# Patient Record
Sex: FEMALE | Race: WHITE | NOT HISPANIC OR LATINO | Employment: FULL TIME | URBAN - METROPOLITAN AREA
[De-identification: names, ages, dates, MRNs, and addresses within clinical notes are randomized per-mention and may not be internally consistent; named-entity substitution may affect disease eponyms.]

---

## 2019-11-20 ENCOUNTER — HOSPITAL ENCOUNTER (EMERGENCY)
Facility: MEDICAL CENTER | Age: 39
End: 2019-11-20
Attending: EMERGENCY MEDICINE
Payer: COMMERCIAL

## 2019-11-20 VITALS
OXYGEN SATURATION: 95 % | HEIGHT: 64 IN | TEMPERATURE: 99.8 F | SYSTOLIC BLOOD PRESSURE: 114 MMHG | HEART RATE: 124 BPM | WEIGHT: 105 LBS | RESPIRATION RATE: 16 BRPM | DIASTOLIC BLOOD PRESSURE: 69 MMHG | BODY MASS INDEX: 17.93 KG/M2

## 2019-11-20 DIAGNOSIS — J10.1 INFLUENZA A: ICD-10-CM

## 2019-11-20 LAB
FLUAV RNA SPEC QL NAA+PROBE: POSITIVE
FLUBV RNA SPEC QL NAA+PROBE: NEGATIVE

## 2019-11-20 PROCEDURE — 99284 EMERGENCY DEPT VISIT MOD MDM: CPT | Mod: EDC

## 2019-11-20 PROCEDURE — A9270 NON-COVERED ITEM OR SERVICE: HCPCS | Mod: EDC | Performed by: EMERGENCY MEDICINE

## 2019-11-20 PROCEDURE — 87502 INFLUENZA DNA AMP PROBE: CPT | Mod: EDC

## 2019-11-20 PROCEDURE — 700102 HCHG RX REV CODE 250 W/ 637 OVERRIDE(OP): Mod: EDC | Performed by: EMERGENCY MEDICINE

## 2019-11-20 RX ORDER — OSELTAMIVIR PHOSPHATE 75 MG/1
75 CAPSULE ORAL 2 TIMES DAILY
Qty: 9 CAP | Refills: 0 | Status: SHIPPED | OUTPATIENT
Start: 2019-11-20 | End: 2019-11-20 | Stop reason: SDUPTHER

## 2019-11-20 RX ORDER — ACETAMINOPHEN 325 MG/1
650 TABLET ORAL ONCE
Status: DISCONTINUED | OUTPATIENT
Start: 2019-11-20 | End: 2019-11-20 | Stop reason: HOSPADM

## 2019-11-20 RX ORDER — OSELTAMIVIR PHOSPHATE 75 MG/1
75 CAPSULE ORAL ONCE
Status: COMPLETED | OUTPATIENT
Start: 2019-11-20 | End: 2019-11-20

## 2019-11-20 RX ORDER — OSELTAMIVIR PHOSPHATE 75 MG/1
75 CAPSULE ORAL 2 TIMES DAILY
Qty: 9 CAP | Refills: 0 | Status: SHIPPED | OUTPATIENT
Start: 2019-11-20 | End: 2019-11-25

## 2019-11-20 RX ADMIN — OSELTAMIVIR PHOSPHATE 75 MG: 75 CAPSULE ORAL at 09:53

## 2019-11-20 SDOH — HEALTH STABILITY: MENTAL HEALTH: HOW OFTEN DO YOU HAVE A DRINK CONTAINING ALCOHOL?: NOT ASKED

## 2019-11-20 NOTE — ED NOTES
Lab called with critical results of influenza A positive. Results read back to lab. Dr. Baron notified.

## 2019-11-20 NOTE — ED TRIAGE NOTES
"Sheyla Elias  39 y.o.  Chief Complaint   Patient presents with   • Cough     \"for a few days\". Pts child with recent respiratory illness.    • Sore Throat     Ambulatory to Yellow 40. Pt alert, pink, interactive and in NAD. Respirations even and unlabored, lungs CTA. Denies vomiting or diarrhea. Call light within reach. Denies additional needs. Aware to remain NPO until cleared by ERP.   "

## 2019-11-20 NOTE — ED NOTES
Discharge teaching for influenza provided to patient and spouse. Reviewed home care, importance of hydration and when to return to ED with worsening symptoms. Tylenol and Motrin dosing discussed. Tamiflu Rx instruction given, script called into preferred pharmacy per pt request.  Instructed on importance of follow up care with Manjula Logan M.D.  [id number] [address] [id number] [phone]    In 3 days       All questions answered, patient verbalizes understanding to all teaching. Copy of discharge paperwork provided. Signed copy in chart. Armband removed. Pt alert, pink, interactive and in NAD. Ambulatory out of department in stable condition.

## 2019-11-20 NOTE — ED PROVIDER NOTES
"ED Provider Note    CHIEF COMPLAINT  Chief Complaint   Patient presents with   • Cough     \"for a few days\". Pts child with recent respiratory illness.    • Sore Throat       HPI  Sheyla Elias is a 39 y.o. female who presents to the emergency room with a cough.  Has minimal dry cough but otherwise doing well.  Symptoms seem to be worse last night.  A bit of a sore throat and scratchiness.  Generalized body aches.  Has not had a fever.  No difficulty breathing.  No vomiting or diarrhea.  No headache neck stiffness or rash.  Everyone at home is sick with a similar illness.  The only reason the mother is checking in this morning is because her 3-year-old son passed away suddenly this morning after 2 days of URI symptoms with fever.    REVIEW OF SYSTEMS  As per HPI.    PAST MEDICAL HISTORY  No medical problems    SOCIAL HISTORY  Social History     Tobacco Use   • Smoking status: Never Smoker   • Smokeless tobacco: Never Used   Substance Use Topics   • Alcohol use: Yes     Comment: occassionally drinks wine   • Drug use: Not on file       SURGICAL HISTORY  History reviewed. No pertinent surgical history.    ALLERGIES  No Known Allergies    PHYSICAL EXAM  VITAL SIGNS: /67   Pulse (!) 115   Temp 37.9 °C (100.2 °F) (Oral)   Resp 16   SpO2 94%    Constitutional: Awake and alert. Nontoxic.  Tearful  HENT: Nares with mucosal edema and mild clear rhinorrhea.  Tympanic membranes clear.  Pharynx normal.  Eyes: Grossly normal  Neck: Normal range of motion  Cardiovascular: Mildly elevated heart rate   Thorax & Lungs: No respiratory distress.  Lung fields are clear throughout without wheezes, rales, rhonchi.  Abdomen: Nontender  Skin:  No pathologic rash.   Extremities: Well perfused  Psychiatric: Affect normal    Results for orders placed or performed during the hospital encounter of 11/20/19   Influenza A/B By PCR (Adult - Flu Only)   Result Value Ref Range    Influenza virus A RNA POSITIVE (A) Negative    " Influenza virus B, PCR Negative Negative        COURSE & MEDICAL DECISION MAKING  Patient presents with URI symptoms of less than 24 hours duration.  Vital signs are stable.  Clinically nontoxic.  Obviously distraught about the events that occurred this morning.  She was given Tylenol for fever.  I obtained an influenza swab that was positive.  Patient given first dose of Tamiflu in the ER.  Patient is nontoxic and appropriate for outpatient management.  I have given a prescription for Tamiflu and advised to return to the ER for any difficulty breathing, worsening symptoms, not improving or concern.    FINAL IMPRESSION  1.  Influenza A      Disposition: home in good condition      This dictation was created using voice recognition software. The accuracy of the dictation is limited to the abilities of the software.  The nursing notes were reviewed and certain aspects of this information were incorporated into this note.      Electronically signed by: Shashi Baron, 11/20/2019 8:40 AM

## 2021-02-11 ENCOUNTER — HOSPITAL ENCOUNTER (OUTPATIENT)
Dept: LAB | Facility: MEDICAL CENTER | Age: 41
End: 2021-02-11
Attending: OBSTETRICS & GYNECOLOGY
Payer: COMMERCIAL

## 2021-02-11 PROCEDURE — 87491 CHLMYD TRACH DNA AMP PROBE: CPT

## 2021-02-11 PROCEDURE — 87591 N.GONORRHOEAE DNA AMP PROB: CPT

## 2021-02-12 LAB
C TRACH DNA SPEC QL NAA+PROBE: NEGATIVE
N GONORRHOEA DNA SPEC QL NAA+PROBE: NEGATIVE
SPECIMEN SOURCE: NORMAL

## 2021-05-20 ENCOUNTER — HOSPITAL ENCOUNTER (OUTPATIENT)
Dept: LAB | Facility: MEDICAL CENTER | Age: 41
End: 2021-05-20
Attending: OBSTETRICS & GYNECOLOGY
Payer: COMMERCIAL

## 2021-05-20 LAB
GLUCOSE 1H P 50 G GLC PO SERPL-MCNC: 131 MG/DL (ref 70–139)
HCT VFR BLD AUTO: 34.9 % (ref 37–47)
HGB BLD-MCNC: 11.6 G/DL (ref 12–16)
PLATELET # BLD AUTO: 236 K/UL (ref 164–446)
TREPONEMA PALLIDUM IGG+IGM AB [PRESENCE] IN SERUM OR PLASMA BY IMMUNOASSAY: NORMAL

## 2021-05-20 PROCEDURE — 85018 HEMOGLOBIN: CPT

## 2021-05-20 PROCEDURE — 36415 COLL VENOUS BLD VENIPUNCTURE: CPT

## 2021-05-20 PROCEDURE — 85014 HEMATOCRIT: CPT

## 2021-05-20 PROCEDURE — 86780 TREPONEMA PALLIDUM: CPT

## 2021-05-20 PROCEDURE — 82950 GLUCOSE TEST: CPT

## 2021-05-20 PROCEDURE — 85049 AUTOMATED PLATELET COUNT: CPT

## 2021-07-22 ENCOUNTER — HOSPITAL ENCOUNTER (OUTPATIENT)
Dept: LAB | Facility: MEDICAL CENTER | Age: 41
End: 2021-07-22
Attending: OBSTETRICS & GYNECOLOGY
Payer: COMMERCIAL

## 2021-07-22 PROCEDURE — 87150 DNA/RNA AMPLIFIED PROBE: CPT

## 2021-07-22 PROCEDURE — 87081 CULTURE SCREEN ONLY: CPT

## 2021-07-23 LAB — GP B STREP DNA SPEC QL NAA+PROBE: NEGATIVE

## 2021-09-03 ENCOUNTER — OFFICE VISIT (OUTPATIENT)
Dept: OBGYN | Facility: CLINIC | Age: 41
End: 2021-09-03
Payer: COMMERCIAL

## 2021-09-03 DIAGNOSIS — O92.29 SORE NIPPLES DUE TO LACTATION: ICD-10-CM

## 2021-09-03 DIAGNOSIS — O92.70 LACTATION PROBLEM: ICD-10-CM

## 2021-09-03 PROCEDURE — 99205 OFFICE O/P NEW HI 60 MIN: CPT | Performed by: NURSE PRACTITIONER

## 2021-09-03 NOTE — PROGRESS NOTES
Summary: Third baby, smallest of all. Exclusive breastfeeding, left nipple developed crack and bleeding, milk coming in more now. Uses Haakaa on the left more often to allow for healing. Baby with frequent stools appropriate to day of age, mild jaundice, alert, healthy appearing.   Today Mom assisted latch cross cradle to right for 12ml, mom could feel deep latch and baby removing milk.Nipple minimally creased but not fully round.  To left cracked side, started in football for different positioning, deep latch, 3ml, nipple round, tried cross cradle after diaper change and dressed, more interested in sleeping. Total intake 15ml, 1/2 feeding but parents offering frequently.   Plan Continue with exclusive breastfeeding Haakaa left to keep milk supply if not offering that breast. Would offer at least 1/2 of the feedings to remove more milk with deeper latch. No pumping needed today. Scale at home would anticipate 2oz gain over the next 3 days. If baby fussy will need to pump and feed back but not indicated at this time.  Follow up Midwife following infant for first 6 weeks, have a family practice doctor in [place] that will follow after and as needed. Lactation follow up as needed, encouraged email with concerns.    Subjective:     Sheyla Elias is a 41 y.o. female here for lactation care. She is here today with baby and  (Bayron).    Concerns:   nipple pain  and sleepy baby  HPI:   Pertinent  history:   Full term home water birth.  Mother does not have a history of GDM, hypertension prior to pregnancy, insulin resistance, multiple gestation, PCOS and thyroid disease. Common condition(s) which may interfere with milk supply.    Mother does have advanced maternal age. Common condition(s) that may interfere in milk supply.    FEEDING HISTORY:    Past breastfeeding history:  other two children  Postpartum course: Exclusive breastfeeding by 48 hours nipples cracked, as milk increasing, right  healing  Currently: 9/3/2021Uses Juanakaa on the left more often to allow for healing. Exclusive breastfeeding no bottles no pumping,  Did provide some expressed milk by syringe once    Both breasts: No not always, but offers frequently  Bottle feeds: 0x/24h    Supplement:   None     Nipple Shield Use: None    Breast Pumping:   Not pumping  Type of pump: Haakaa    Maternal ROS:  Constitutional: No fever, chills. Feeling well  Breasts: No soreness of breasts and Soreness of breasts  Psychiatric: Managing ok  Mental Health: No mention of feeling irritable, agitated, angry, overwhelmed, apathy, exhaustion nor having sleep changes outside infant feeds/demands or appetite changes     Objective:     Maternal Physical   General: No acute distress  Breasts: Symetrical , Soft, Plugged Duct - no evidence and Mastitis  - no S/S  Nipples: abraded, cracked, erythematous, scabbed/crusting and across face of nipple on the left, right is intact.  Psychiatric:  Normal mood and affect. Her behavior is normal. Judgment and thought content normal   Mental Health:  Did NOT exhibit sadness, crying, feeling overwhelmed, agitation or hypervigilance.    Assessment/Plan & Lactation Counseling:     Infant exam on infant chart    Infant Weight History:   8/30/21 6#2.0oz  9/3/2021 5#11.5oz diaper  Infant intake at Breast:: right 12ml      Left 3ml     Total: 15ml  Milk Transfer at this feeding:   Effective breastfeeding   Initiation of Feeding: Infant initiates  Position of Feeding:    Right: cross cradle  Left: football and cross cradle  Attachment Achieved: rapidly  Nipple shield: N/A       Suck Pattern at the breast: Suck burst and normal rest  Behavior Following Observed Feeding: sleeping    Nipple Pain from:High vacuum causing nipple strain resulting in damage and Contact forces of the tongue causing nipple strain resulting in damage     Latch: Assisted latch and Shallow latch  Suckling/Feeding: attaches, audible swallows, baby fed  effectively, baby roots, elicits KRANTHI, frequent pauses and rhythmic  Milk Supply Available: normal  Maternal Diagnosis/Problem:  Lactation problem    BREASTFEEDING PLAN  Discussed concerns and symptoms as listed above in assessment and guidance summarized below.  Topics reviewed included:    •  Self Care. Supportive spouse, Encourage rest, getting out of the house each day, walk when air clearer,   a treat at a drive thru.    • Milk supply is dependent on glandular tissue development, hormonal influences, how many times the baby removes milk and how well the breasts are emptied in a 24 hour period. This is a biological reality that we can NOT work around. If, for any reason, your baby is not latching, or you are not able to nurse, then it is important for you to remove the milk instead by pumping or hand expression.  There's no magic trick, tea, food, drink, cookie or supplement that will increase your milk supply. One  must  effectively remove milk to continue to make and maximize milk. In the early days and weeks that can be 8+ times in 24 hours. For older babies, on average 6-7 + times in 24 hours.      •  Feeding:   o Feed your baby every 1.5-3 hours, more often if baby acts hungry.   o Awaken baby for feeding if going over 3 hours in the day.   o Until back to birth weight, ONE four hour at night is acceptable if has had 8 prior feedings in 24 hours.    o Need to get in 8-12 feedings per 24 hours. If one sided nursing may need 15 feedings per day  o Parents most responsive to feeding and waking baby   •  Supplement:   • No supplement is needed     Pacifier Use:  The [employer] Position Paper reports: Although we recommend a conservative approach regarding pacifier use, we do not endorse a complete ban on the use of pacifiers, nor do we support an approach that induces parental guilt concerning their choices about the use of pacifiers.    • Positioning Techniques for bare  breast  o Suggested positions Cross cradle and Football  o Fine tune position by making sure your fingers beneath the breast as well as your bra, are out of the way of your baby's chin.  o Positioning:  Many positions shown, great sidelying at 7 minutes.   o See http://globalhealthmedia.org/portfolio-items/positions-for-breastfeeding/?lihslrikpIS=35871    •  Latch on Techniques for bare breast.   o Fine tune latch:  - By holding your baby more securely at the breast, assisting your baby to stay attached by:  - Bringing your baby to your breast, not breast to the baby  - Your baby's cheek to touch breast securely, nose tipped back  - Hold your baby firmly in place so when your baby forgets to suck and picks it back up again your baby is in the correct spot. You will be extinguishing behavior and replacing it with a deeper latch to stimulate suck and provide satisfaction at the breast  - Your baby needs as much breast as deep in the mouth as possible to allow your nipples to heal and for you more importantly to maximize efficiency at the breast  • Latch is asymmetrical, leading with the chin, getting more underneath.Nipple care:  • May apply breastmilk  • Moist-oily ointment after feeding/pumping, ie Lanolin nipple butter, coconut or olive oil, if desired/needed 2-3 times/day until nipples are healed  • You do not need to wash this off before pumping or feeding the baby  • You may want to remove baby from breast when active swallowing stops to avoid prolonged nipple compression  • Hydrogels recommended    •  Connect with other mothers:  o Facebook:   - [place] [facility]: https://www.PingMe.com/[place].breastfeeds/  - Well-Nourished Babies (Private group for questions and support): https://www.facebook.com/groups/272060731035710/  o Breastfeeding [place]   - This is an emotional, informational and safe support [place] with your like-minded community that builds solidarity among moms  - The honest experiences of mothers  are highly valued among the other mothers  - Tuesday  at 11am by Magali,  you will be sent an invitation.   - You may instead copy and paste this link:    [email or web address]    - Passcode  [id number]  - You may share this link with friends; please don't post on social media.       Follow up requires close monitoring in this time sensitive window of opportunity to establish milk supply and facilitate the learning of  breastfeeding.    Mom is encouraged to e-mail to update how the plan is working.  Midwife for baby care appointment: Today    Follow-up for infant weight check and dyad breastfeeding evaluation as needed  Please call [phone] if you have not scheduled your next appointment    A total of 69 minutes, not including infant assessment time, with more than 50% was spent preparing to see the patient, obtaining and reviewing separately obtained history, performing a medically appropriate examination and evaluation, counseling and educating the family,  documenting clinical information in the electronic health record, independently interpreting weighted feeds and infant growth results, communicating these results to the family and care coordination as detailed in the above note.       FILIPE Kline.

## 2021-10-15 ENCOUNTER — HOSPITAL ENCOUNTER (OUTPATIENT)
Facility: MEDICAL CENTER | Age: 41
End: 2021-10-15
Attending: OBSTETRICS & GYNECOLOGY
Payer: COMMERCIAL

## 2021-10-15 LAB
T3 SERPL-MCNC: 80.7 NG/DL (ref 60–181)
T4 FREE SERPL-MCNC: 1.26 NG/DL (ref 0.93–1.7)
THYROPEROXIDASE AB SERPL-ACNC: 21 IU/ML (ref 0–9)
TSH SERPL DL<=0.005 MIU/L-ACNC: 1.45 UIU/ML (ref 0.38–5.33)

## 2021-10-15 PROCEDURE — 84439 ASSAY OF FREE THYROXINE: CPT

## 2021-10-15 PROCEDURE — 84480 ASSAY TRIIODOTHYRONINE (T3): CPT

## 2021-10-15 PROCEDURE — 36415 COLL VENOUS BLD VENIPUNCTURE: CPT

## 2021-10-15 PROCEDURE — 86376 MICROSOMAL ANTIBODY EACH: CPT

## 2021-10-15 PROCEDURE — 84443 ASSAY THYROID STIM HORMONE: CPT

## 2021-10-15 PROCEDURE — 86800 THYROGLOBULIN ANTIBODY: CPT

## 2021-10-18 LAB — THYROGLOB AB SERPL-ACNC: 4.8 IU/ML (ref 0–4)
